# Patient Record
Sex: MALE | Race: WHITE | URBAN - METROPOLITAN AREA
[De-identification: names, ages, dates, MRNs, and addresses within clinical notes are randomized per-mention and may not be internally consistent; named-entity substitution may affect disease eponyms.]

---

## 2022-10-04 ENCOUNTER — OFFICE VISIT (OUTPATIENT)
Dept: PRIMARY CARE CLINIC | Age: 52
End: 2022-10-04
Payer: MEDICARE

## 2022-10-04 VITALS
HEART RATE: 95 BPM | HEIGHT: 72 IN | WEIGHT: 257.4 LBS | SYSTOLIC BLOOD PRESSURE: 155 MMHG | TEMPERATURE: 97.8 F | DIASTOLIC BLOOD PRESSURE: 92 MMHG | BODY MASS INDEX: 34.86 KG/M2 | OXYGEN SATURATION: 98 % | RESPIRATION RATE: 16 BRPM

## 2022-10-04 DIAGNOSIS — E78.1 HYPERTRIGLYCERIDEMIA: ICD-10-CM

## 2022-10-04 DIAGNOSIS — M25.561 CHRONIC PAIN OF BOTH KNEES: ICD-10-CM

## 2022-10-04 DIAGNOSIS — G47.30 OBSERVED SLEEP APNEA: ICD-10-CM

## 2022-10-04 DIAGNOSIS — I10 ESSENTIAL HYPERTENSION: ICD-10-CM

## 2022-10-04 DIAGNOSIS — Z13.220 SCREENING FOR HYPERLIPIDEMIA: ICD-10-CM

## 2022-10-04 DIAGNOSIS — Z12.11 SCREEN FOR COLON CANCER: Primary | ICD-10-CM

## 2022-10-04 DIAGNOSIS — Z23 ENCOUNTER FOR IMMUNIZATION: ICD-10-CM

## 2022-10-04 DIAGNOSIS — M25.562 CHRONIC PAIN OF BOTH KNEES: ICD-10-CM

## 2022-10-04 DIAGNOSIS — G89.29 CHRONIC PAIN OF BOTH KNEES: ICD-10-CM

## 2022-10-04 PROCEDURE — 99204 OFFICE O/P NEW MOD 45 MIN: CPT | Performed by: FAMILY MEDICINE

## 2022-10-04 PROCEDURE — G0008 ADMIN INFLUENZA VIRUS VAC: HCPCS | Performed by: FAMILY MEDICINE

## 2022-10-04 PROCEDURE — 90686 IIV4 VACC NO PRSV 0.5 ML IM: CPT | Performed by: FAMILY MEDICINE

## 2022-10-04 RX ORDER — TESTOSTERONE ENANTHATE 200 MG/ML
VIAL (ML) INTRAMUSCULAR ONCE
COMMUNITY

## 2022-10-04 RX ORDER — TESTOSTERONE CYPIONATE 100 MG/ML
INJECTION, SOLUTION INTRAMUSCULAR ONCE
COMMUNITY
End: 2022-10-04

## 2022-10-04 RX ORDER — AMLODIPINE BESYLATE 10 MG/1
10 TABLET ORAL DAILY
Qty: 30 TABLET | Refills: 5 | Status: SHIPPED | OUTPATIENT
Start: 2022-10-04

## 2022-10-04 RX ORDER — SPIRONOLACTONE 50 MG/1
50 TABLET, FILM COATED ORAL DAILY
COMMUNITY
Start: 2022-09-17

## 2022-10-04 NOTE — PROGRESS NOTES
HPI     Chief Complaint   Patient presents with    [facility]     He is a 50 y.o. male who presents to establish care. Pmhx : HTN, gout, low testosterone. Surghx : none. Sochx : no tobacco use. Drinks 2 days per week, 3-4 drinks when he does. Is not exercising regularly. Maintains a healthy diet. Fhx : noncontributory. HTN : intolerant of hctz as this worsened gout. Home bp have been elevated. Sbp trending in 150s. Testosteron def : follows with urology. Labs 8/23/22, cbc, cmp, psa and testosterone were ok. C/o intermittent knee pain for several months now. Seems to occur throughout the day. 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 Seemed worse when he was walking daily for exercise. Establishing Care  - Chronic medical problems:  Past Medical History:   Diagnosis Date    Gout     Hypertension      - Current medications:   Current Outpatient Medications   Medication Sig    ALLOPURINOL PO     spironolactone (ALDACTONE) 50 mg tablet Take 50 mg by mouth daily. testosterone enanthate (DELATESTRYL) 200 mg/mL injection by IntraMUSCular route once. No current facility-administered medications for this visit. - Family history:   Family History   Problem Relation Age of Onset    Hypertension Mother      - Allergies: No Known Allergies  - Surgical history: History reviewed.  No pertinent surgical history.  - Social history (sexually active, occupation, smoker, etoh use, etc):   Social History     Socioeconomic History    Marital status: UNKNOWN     Spouse name: Not on file    Number of children: Not on file    Years of education: Not on file    Highest education level: Not on file   Occupational History    Not on file   Tobacco Use    Smoking status: Former     Types: Cigarettes    Smokeless tobacco: Never   Vaping Use    Vaping Use: Never used   Substance and Sexual Activity    Alcohol use: Yes    Drug use: Never    Sexual activity: Not on file   Other Topics Concern    Not on file Social History Narrative    Not on file     Social Determinants of Health     Financial Resource Strain: Low Risk     Difficulty of Paying Living Expenses: Not hard at all   Food Insecurity: No Food Insecurity    Worried About Running Out of Food in the Last Year: Never true    Ran Out of Food in the Last Year: Never true   Transportation Needs: Not on file   Physical Activity: Insufficiently Active    Days of Exercise per Week: 2 days    Minutes of Exercise per Session: 30 min   Stress: Not on file   Social Connections: Not on file   Intimate Partner Violence: Not At Risk    Fear of Current or Ex-Partner: No    Emotionally Abused: No    Physically Abused: No    Sexually Abused: No   Housing Stability: Not on file         Review of Systems  Denies fever, chills, chest pain, shortness of breath, abd pain, nausea, vomiting. No changes in bm. No signs of bleeding. No syncope or presyncope. Reviewed PmHx, RxHx, FmHx, SocHx, AllgHx and updated and dated in the chart. Physical Exam:  Visit Vitals  BP (!) 155/92   Pulse 95   Temp 97.8 °F (36.6 °C) (Temporal)   Resp 16   Ht 6' (1.829 m)   Wt 257 lb 6.4 oz (116.8 kg)   SpO2 98%   BMI 34.91 kg/m²     Physical Exam  Vitals reviewed. Constitutional:       Appearance: Normal appearance. HENT:      Head: Normocephalic and atraumatic. Eyes:      Conjunctiva/sclera: Conjunctivae normal.      Pupils: Pupils are equal, round, and reactive to light. Cardiovascular:      Rate and Rhythm: Normal rate and regular rhythm. Pulses: Normal pulses. Heart sounds: Normal heart sounds. Pulmonary:      Effort: Pulmonary effort is normal.      Breath sounds: Normal breath sounds. Musculoskeletal:      Left lower leg: No edema. Comments: Bilat knee exam : Knee ROM intact. No appreciated joint instability. No joint effusion or tenderness. Tender just distal to medial knee joint bilat. No calf tenderness. Bilat LE distal pulses intact.    Skin:     General: Skin is warm and dry. Neurological:      General: No focal deficit present. Mental Status: He is alert and oriented to person, place, and time. Psychiatric:         Mood and Affect: Mood normal.         Behavior: Behavior normal.         Thought Content: Thought content normal.            Assessment / Plan     Diagnoses and all orders for this visit:    1. Screen for colon cancer  -     REFERRAL TO GASTROENTEROLOGY    2. Screening for hyperlipidemia  -     LIPID PANEL; Future    3. Observed sleep apnea  -     SLEEP MEDICINE REFERRAL    4. Essential hypertension  -     LIPID PANEL; Future  -     SLEEP MEDICINE REFERRAL  -     amLODIPine (NORVASC) 10 mg tablet; Take 1 Tablet by mouth daily. 5. Encounter for immunization  -     INFLUENZA, FLUARIX, FLULAVAL, FLUZONE (AGE 6 MO+), AFLURIA(AGE 3Y+) IM, PF, 0.5 ML    6. Chronic pain of both knees  Suspect pes anserine bursitis. Try applied ice and stretching exercises daily, handout provided. Follow up for xrays and PT if not improved within 2 weeks. Advised regular exercise, hh diet. Start amlodipine. Bp goals discussed. Follow up if bp not at goal within 2 weeks. Obtain medical records    I have discussed the diagnosis with the patient and the intended plan as seen in the above orders.      Jaquan Robertson, DO

## 2022-10-05 RX ORDER — ATORVASTATIN CALCIUM 20 MG/1
20 TABLET, FILM COATED ORAL DAILY
Qty: 30 TABLET | Refills: 5 | Status: SHIPPED | OUTPATIENT
Start: 2022-10-05

## 2022-11-03 ENCOUNTER — OFFICE VISIT (OUTPATIENT)
Dept: SLEEP MEDICINE | Age: 52
End: 2022-11-03
Payer: MEDICARE

## 2022-11-03 VITALS
BODY MASS INDEX: 34.93 KG/M2 | HEIGHT: 72 IN | WEIGHT: 257.9 LBS | HEART RATE: 64 BPM | SYSTOLIC BLOOD PRESSURE: 157 MMHG | OXYGEN SATURATION: 97 % | DIASTOLIC BLOOD PRESSURE: 83 MMHG

## 2022-11-03 DIAGNOSIS — G47.33 OSA (OBSTRUCTIVE SLEEP APNEA): Primary | ICD-10-CM

## 2022-11-03 DIAGNOSIS — E66.9 OBESITY (BMI 30.0-34.9): ICD-10-CM

## 2022-11-03 PROCEDURE — G8417 CALC BMI ABV UP PARAM F/U: HCPCS | Performed by: SPECIALIST

## 2022-11-03 PROCEDURE — 99204 OFFICE O/P NEW MOD 45 MIN: CPT | Performed by: SPECIALIST

## 2022-11-03 PROCEDURE — G8432 DEP SCR NOT DOC, RNG: HCPCS | Performed by: SPECIALIST

## 2022-11-03 PROCEDURE — G8427 DOCREV CUR MEDS BY ELIG CLIN: HCPCS | Performed by: SPECIALIST

## 2022-11-03 PROCEDURE — 3017F COLORECTAL CA SCREEN DOC REV: CPT | Performed by: SPECIALIST

## 2022-11-03 NOTE — PROGRESS NOTES
[address] [facility] [place]. [place], [address]  Tel.  [phone]  Fax. 100 [facility] 60  [place], [address] [facility]  Tel.  [phone]  Fax. [phone] [address]  Tel.  [phone]  Fax. [phone]       Chief Complaint       Chief Complaint   Patient presents with    Sleep Problem     NP refd by Valente Linda DO for witnessed apnea       HPI      Alben Closs is 50 y.o. male seen for evaluation of a sleep disorder. The patient reports he has experienced snoring described as loud; may be more pronounced after consuming alcohol. Normally retires between 8: 30-11: 30 PM and will get a bed between 5-6 AM.  Tired at times on awakening. Does note frequent dreams. May have difficulty with sleep initiation. Denies sleep talking or sleepwalking, bruxism or nocturnal incontinence, abnormal arm or leg movements, hypnagogue hallucinations, sleep paralysis or cataplexy. Does not fall asleep inappropriately during the day. The patient has not undergone diagnostic testing for the current problems. [place] Sleepiness Score: (P) 4       No Known Allergies    Current Outpatient Medications   Medication Sig Dispense Refill    atorvastatin (LIPITOR) 20 mg tablet Take 1 Tablet by mouth daily. 30 Tablet 5    ALLOPURINOL PO       testosterone enanthate (DELATESTRYL) 200 mg/mL injection by IntraMUSCular route once. amLODIPine (NORVASC) 10 mg tablet Take 1 Tablet by mouth daily. 30 Tablet 5    spironolactone (ALDACTONE) 50 mg tablet Take 50 mg by mouth daily. He  has a past medical history of Gout and Hypertension. He  has no past surgical history on file. He family history includes Hypertension in his mother. He  reports that he has quit smoking. His smoking use included cigarettes. He has never used smokeless tobacco. He reports current alcohol use. He reports that he does not use drugs.      Review of Systems:  Review of Systems   HENT:  Negative for hearing loss and tinnitus. Eyes:  Negative for blurred vision and double vision. Respiratory:  Negative for cough and shortness of breath. Cardiovascular:  Negative for chest pain and palpitations. Gastrointestinal:  Negative for abdominal pain and heartburn. Genitourinary:  Negative for frequency and urgency. Musculoskeletal:  Negative for back pain and neck pain. Neurological:  Negative for dizziness and headaches. Psychiatric/Behavioral:  Negative for depression. The patient is not nervous/anxious. Objective:   Visit Vitals  BP (!) 157/83 (BP 1 Location: Left upper arm, BP Patient Position: Sitting)   Pulse 64   Ht 6' (1.829 m)   Wt 257 lb 14.4 oz (117 kg)   SpO2 97%   BMI 34.98 kg/m²     Body mass index is 34.98 kg/m². General:   Conversant, cooperative   Eyes:  Pupils equal and reactive, no nystagmus   Oropharynx:   Mallampati score III,  tongue scalloped, narrow posterior oral airway       Neck:   No carotid bruits; Neck circ. in \"inches\": 18.5   Chest/Lungs:  Clear on auscultation    CVS:  Normal rate, regular rhythm   Skin:  Warm to touch; no obvious rashes   Neuro:  Speech fluent, face symmetrical, tongue movement normal   Psych:  Normal affect,  normal countenance        Assessment:       ICD-10-CM ICD-9-CM    1. VERNELL (obstructive sleep apnea)  G47.33 327.23 SLEEP STUDY UNATTENDED, 4 CHANNEL      2. Obesity (BMI 30.0-34. 9)  E66.9 278.00         History consistent with sleep disordered breathing. Patient has a narrow posterior oral airway, large tongue. Sleep breathing abnormalities may be more prominent when supine, and/or in rem sleep. Benefit of weight reduction. Patient was advised weight loss measures often more effective when sleep disordered breathing concurrently treated.     Plan:     Orders Placed This Encounter    SLEEP STUDY UNATTENDED, 4 CHANNEL     Order Specific Question:   Reason for Exam     Answer:   snoring * Patient has a history and examination consistent with the diagnosis of sleep apnea. *Home sleep testing was ordered for initial evaluation. * He was provided information on sleep apnea including corresponding risk factors and the importance of proper treatment. * Treatment options if indicated were reviewed today. Instructions: The patient would benefit from weight reduction measures. Do not engage in activities requiring a normal degree of alertness if fatigue is present. The patient understands that untreated or undertreated sleep apnea could impair judgement and the ability to function normally during the day. Call or return if symptoms worsen or persist.          Troy Barron MD, FAASM  Electronically signed 11/04/22       This note was created using voice recognition software. Despite editing, there may be syntax errors. This note will not be viewable in [address]

## 2022-12-05 ENCOUNTER — HOSPITAL ENCOUNTER (OUTPATIENT)
Dept: SLEEP MEDICINE | Age: 52
Discharge: HOME | End: 2022-12-05
Payer: MEDICARE

## 2022-12-05 ENCOUNTER — OFFICE VISIT (OUTPATIENT)
Dept: SLEEP MEDICINE | Age: 52
End: 2022-12-05

## 2022-12-05 DIAGNOSIS — G47.33 OSA (OBSTRUCTIVE SLEEP APNEA): Primary | ICD-10-CM

## 2022-12-05 PROCEDURE — 95806 SLEEP STUDY UNATT&RESP EFFT: CPT | Performed by: SPECIALIST

## 2022-12-05 NOTE — PROGRESS NOTES
217 [facility]. [place], [address]  Tel.  [phone]  Fax. 100 [facility] 60  [place], [address] [facility]  Tel.  [phone]  Fax. [phone] [address] [facility] [address]   Tel.  [phone]  Fax. [phone]       S>Drew Mari is a 50 y.o. male seen today to receive a home sleep testing unit (HST). Patient was educated on proper hookup and operation of the HST. Instruction forms and documentation were reviewed and signed. The patient demonstrated good understanding of the HST.    O>    There were no vitals taken for this visit. A>  No diagnosis found. P>  General information regarding operations and maintenance of the device was provided. He was provided information on sleep apnea including coresponding risk factors and the importance of proper treatment. Follow-up appointment was made to return the HST. He will be contacted once the results have been reviewed. He was asked to contact our office for any problems regarding his home sleep test study.     [facility] [id number]

## 2022-12-06 ENCOUNTER — TELEPHONE (OUTPATIENT)
Dept: SLEEP MEDICINE | Age: 52
End: 2022-12-06

## 2022-12-06 DIAGNOSIS — G47.33 OSA (OBSTRUCTIVE SLEEP APNEA): Primary | ICD-10-CM

## 2022-12-08 ENCOUNTER — DOCUMENTATION ONLY (OUTPATIENT)
Dept: SLEEP MEDICINE | Age: 52
End: 2022-12-08

## 2022-12-08 NOTE — TELEPHONE ENCOUNTER
HSAT demonstrated sleep disordered breathing characterized by an overall AHI of 12.3/h associated with minimal SaO2 of 67%. Snoring during 77.5%. HSAT potentially underestimated sleep apnea severity due to periods of absent pulse ox signal.    Recommendation: APAP 7-18 cm    Sleep technologist: Please review HSAT results with the patient. Order has been entered for APAP 7-18 cm. Please schedule first compliance appointment.

## 2022-12-12 ENCOUNTER — PATIENT MESSAGE (OUTPATIENT)
Dept: SLEEP MEDICINE | Age: 52
End: 2022-12-12

## 2023-04-20 ENCOUNTER — DOCUMENTATION ONLY (OUTPATIENT)
Dept: SLEEP MEDICINE | Age: 53
End: 2023-04-20

## 2023-04-20 ENCOUNTER — OFFICE VISIT (OUTPATIENT)
Dept: SLEEP MEDICINE | Age: 53
End: 2023-04-20
Payer: MEDICARE

## 2023-04-20 VITALS
DIASTOLIC BLOOD PRESSURE: 95 MMHG | WEIGHT: 260.9 LBS | SYSTOLIC BLOOD PRESSURE: 156 MMHG | HEIGHT: 72 IN | BODY MASS INDEX: 35.34 KG/M2 | HEART RATE: 94 BPM | OXYGEN SATURATION: 91 %

## 2023-04-20 DIAGNOSIS — G47.33 OSA (OBSTRUCTIVE SLEEP APNEA): Primary | ICD-10-CM

## 2023-04-20 DIAGNOSIS — I10 PRIMARY HYPERTENSION: ICD-10-CM

## 2023-04-20 PROCEDURE — G8427 DOCREV CUR MEDS BY ELIG CLIN: HCPCS | Performed by: SPECIALIST

## 2023-04-20 PROCEDURE — 99213 OFFICE O/P EST LOW 20 MIN: CPT | Performed by: SPECIALIST

## 2023-04-20 PROCEDURE — 3080F DIAST BP >= 90 MM HG: CPT | Performed by: SPECIALIST

## 2023-04-20 PROCEDURE — 3077F SYST BP >= 140 MM HG: CPT | Performed by: SPECIALIST

## 2023-04-20 PROCEDURE — G8417 CALC BMI ABV UP PARAM F/U: HCPCS | Performed by: SPECIALIST

## 2023-04-20 PROCEDURE — 3017F COLORECTAL CA SCREEN DOC REV: CPT | Performed by: SPECIALIST

## 2023-04-20 PROCEDURE — G8432 DEP SCR NOT DOC, RNG: HCPCS | Performed by: SPECIALIST

## 2023-04-20 NOTE — PROGRESS NOTES
217 [facility]., [place]. [place], [address]  Tel.  [phone]  Fax. 100 [facility] 60  [place], [address] [facility]  Tel.  [phone]  Fax. [phone] [address]   Tel.  [phone]  Fax. [phone]         Chief Complaint       Chief Complaint   Patient presents with    Sleep Problem     1st adh will bring device         HPI      h  Joel Astudillo is a 50 y.o. male seen for follow-up. He was evaluated with a home sleep study which demonstrated sleep disordered breathing characterized by an overall AHI of 12.3/h associated with minimal SaO2 of 67%. Snoring during 77.5%. HSAT potentially underestimated sleep apnea severity due to periods of absent pulse ox signal.     Recommendation: APAP 7-18     Compliance data downloaded and reviewed in detail with the patient today. During the past 30 days, APAP used during 29 days with the average daily use of 6 hours. CMS compliance criteria 90%. AHI 0.4 per hour. With CPAP is not experiencing significant nocturnal awakening, nonrestorative sleep or excessive daytime sleepiness. [place] Sleepiness Scale: 3    No Known Allergies    Current Outpatient Medications   Medication Sig Dispense Refill    atorvastatin (LIPITOR) 20 mg tablet Take 1 Tablet by mouth daily. 30 Tablet 5    ALLOPURINOL PO       spironolactone (ALDACTONE) 50 mg tablet Take 1 Tablet by mouth daily. testosterone enanthate (DELATESTRYL) 200 mg/mL injection by IntraMUSCular route once. amLODIPine (NORVASC) 10 mg tablet Take 1 Tablet by mouth daily. 30 Tablet 5        He  has a past medical history of Gout and Hypertension. He  has no past surgical history on file. He family history includes Hypertension in his mother. He  reports that he has quit smoking. His smoking use included cigarettes. He has never used smokeless tobacco. He reports current alcohol use. He reports that he does not use drugs. Review of Systems:  Unchanged per patient      Objective:   Visit Vitals  BP (!) 156/95 (BP 1 Location: Left upper arm, BP Patient Position: Sitting, BP Cuff Size: Large adult)   Pulse 94   Ht 6' (1.829 m)   Wt 260 lb 14.4 oz (118.3 kg)   SpO2 91%   BMI 35.38 kg/m²     Body mass index is 35.38 kg/m². General:   Conversant, cooperative       Oropharynx:   Mallampati score III, tongue scalloped, narrow posterior oral airway                CVS:  Normal rate, regular rhythm        Neuro:  Speech fluent, face symmetrical             Assessment:       ICD-10-CM ICD-9-CM    1. VERNELL (obstructive sleep apnea)  G47.33 327.23       2. Primary hypertension  I10 401.9           he is compliant with PAP therapy and PAP continues to benefit patient and remains necessary for control of his sleep apnea. To follow-up with PCP re. hypertension    Plan:   No orders of the defined types were placed in this encounter. *A copy of compliance data was provided to the patient and reviewed in detail. *CPAP will be continued at the above pressure settings. The patient is to contact the office if there are problems with either mask or pressure settings. Follow-up will be scheduled at which time compliance data will be reviewed. * Patient has a history and examination consistent with the diagnosis of sleep apnea. * Treatment options if indicated were reviewed today. Oskar Cruz MD, FAASM  Electronically signed 04/20/23        This note was created using voice recognition software. Despite editing, there may be syntax errors. This note will not be viewable in [address]

## 2023-07-23 DIAGNOSIS — E78.1 PURE HYPERGLYCERIDEMIA: ICD-10-CM

## 2023-07-24 RX ORDER — ATORVASTATIN CALCIUM 20 MG/1
TABLET, FILM COATED ORAL
Qty: 90 TABLET | Refills: 0 | Status: SHIPPED | OUTPATIENT
Start: 2023-07-24

## 2023-07-28 DIAGNOSIS — I10 ESSENTIAL (PRIMARY) HYPERTENSION: ICD-10-CM

## 2023-07-31 RX ORDER — AMLODIPINE BESYLATE 10 MG/1
TABLET ORAL
Qty: 30 TABLET | Refills: 0 | Status: SHIPPED | OUTPATIENT
Start: 2023-07-31

## 2023-08-27 DIAGNOSIS — I10 ESSENTIAL (PRIMARY) HYPERTENSION: ICD-10-CM

## 2023-08-31 DIAGNOSIS — I10 ESSENTIAL (PRIMARY) HYPERTENSION: ICD-10-CM

## 2023-08-31 RX ORDER — AMLODIPINE BESYLATE 10 MG/1
TABLET ORAL
Qty: 30 TABLET | Refills: 0 | Status: SHIPPED | OUTPATIENT
Start: 2023-08-31

## 2023-09-05 RX ORDER — AMLODIPINE BESYLATE 10 MG/1
TABLET ORAL
Qty: 30 TABLET | Refills: 0 | OUTPATIENT
Start: 2023-09-05

## 2023-10-02 DIAGNOSIS — I10 ESSENTIAL (PRIMARY) HYPERTENSION: ICD-10-CM

## 2023-10-04 RX ORDER — AMLODIPINE BESYLATE 10 MG/1
TABLET ORAL
Qty: 30 TABLET | Refills: 0 | OUTPATIENT
Start: 2023-10-04

## 2023-10-04 NOTE — TELEPHONE ENCOUNTER
Requested Prescriptions     Pending Prescriptions Disp Refills    amLODIPine (NORVASC) 10 MG tablet [Pharmacy Med Name: AMLODIPINE BESYLATE 10 MG TAB] 30 tablet 0     Sig: APPOINTMENT REQUIRED FOR FURTHER REFILLS.         Last Visit 10/4/22  Last Refill 8/31/23

## 2023-10-26 DIAGNOSIS — E78.1 PURE HYPERGLYCERIDEMIA: ICD-10-CM

## 2023-10-31 DIAGNOSIS — I10 ESSENTIAL (PRIMARY) HYPERTENSION: ICD-10-CM

## 2023-11-02 RX ORDER — ATORVASTATIN CALCIUM 20 MG/1
TABLET, FILM COATED ORAL
Qty: 90 TABLET | Refills: 0 | OUTPATIENT
Start: 2023-11-02

## 2023-11-02 RX ORDER — AMLODIPINE BESYLATE 10 MG/1
TABLET ORAL
Qty: 30 TABLET | Refills: 0 | OUTPATIENT
Start: 2023-11-02

## 2024-01-19 ENCOUNTER — OFFICE VISIT (OUTPATIENT)
Dept: PRIMARY CARE CLINIC | Facility: CLINIC | Age: 54
End: 2024-01-19
Payer: COMMERCIAL

## 2024-01-19 ENCOUNTER — HOSPITAL ENCOUNTER (OUTPATIENT)
Facility: HOSPITAL | Age: 54
Discharge: HOME | End: 2024-01-19
Attending: FAMILY MEDICINE
Payer: COMMERCIAL

## 2024-01-19 VITALS
OXYGEN SATURATION: 98 % | WEIGHT: 259 LBS | BODY MASS INDEX: 35.08 KG/M2 | RESPIRATION RATE: 12 BRPM | HEIGHT: 72 IN | SYSTOLIC BLOOD PRESSURE: 162 MMHG | TEMPERATURE: 97.8 F | DIASTOLIC BLOOD PRESSURE: 92 MMHG | HEART RATE: 84 BPM

## 2024-01-19 DIAGNOSIS — R73.02 IGT (IMPAIRED GLUCOSE TOLERANCE): ICD-10-CM

## 2024-01-19 DIAGNOSIS — E78.1 PURE HYPERGLYCERIDEMIA: ICD-10-CM

## 2024-01-19 DIAGNOSIS — G89.29 CHRONIC PAIN OF LEFT KNEE: ICD-10-CM

## 2024-01-19 DIAGNOSIS — Z12.11 SCREEN FOR COLON CANCER: ICD-10-CM

## 2024-01-19 DIAGNOSIS — M25.562 CHRONIC PAIN OF LEFT KNEE: ICD-10-CM

## 2024-01-19 DIAGNOSIS — I10 ESSENTIAL (PRIMARY) HYPERTENSION: Primary | ICD-10-CM

## 2024-01-19 PROCEDURE — 4004F PT TOBACCO SCREEN RCVD TLK: CPT | Performed by: FAMILY MEDICINE

## 2024-01-19 PROCEDURE — 73562 X-RAY EXAM OF KNEE 3: CPT

## 2024-01-19 PROCEDURE — G8427 DOCREV CUR MEDS BY ELIG CLIN: HCPCS | Performed by: FAMILY MEDICINE

## 2024-01-19 PROCEDURE — G8484 FLU IMMUNIZE NO ADMIN: HCPCS | Performed by: FAMILY MEDICINE

## 2024-01-19 PROCEDURE — 3079F DIAST BP 80-89 MM HG: CPT | Performed by: FAMILY MEDICINE

## 2024-01-19 PROCEDURE — G8417 CALC BMI ABV UP PARAM F/U: HCPCS | Performed by: FAMILY MEDICINE

## 2024-01-19 PROCEDURE — 99214 OFFICE O/P EST MOD 30 MIN: CPT | Performed by: FAMILY MEDICINE

## 2024-01-19 PROCEDURE — 3077F SYST BP >= 140 MM HG: CPT | Performed by: FAMILY MEDICINE

## 2024-01-19 PROCEDURE — 3017F COLORECTAL CA SCREEN DOC REV: CPT | Performed by: FAMILY MEDICINE

## 2024-01-19 RX ORDER — AMLODIPINE BESYLATE AND BENAZEPRIL HYDROCHLORIDE 10; 20 MG/1; MG/1
1 CAPSULE ORAL DAILY
Qty: 30 CAPSULE | Refills: 3 | Status: SHIPPED | OUTPATIENT
Start: 2024-01-19 | End: 2024-01-19

## 2024-01-19 RX ORDER — AMLODIPINE AND BENAZEPRIL HYDROCHLORIDE 10; 40 MG/1; MG/1
1 CAPSULE ORAL DAILY
Qty: 30 CAPSULE | Refills: 3 | Status: SHIPPED | OUTPATIENT
Start: 2024-01-19

## 2024-01-19 SDOH — ECONOMIC STABILITY: FOOD INSECURITY: WITHIN THE PAST 12 MONTHS, YOU WORRIED THAT YOUR FOOD WOULD RUN OUT BEFORE YOU GOT MONEY TO BUY MORE.: NEVER TRUE

## 2024-01-19 SDOH — ECONOMIC STABILITY: HOUSING INSECURITY
IN THE LAST 12 MONTHS, WAS THERE A TIME WHEN YOU DID NOT HAVE A STEADY PLACE TO SLEEP OR SLEPT IN A SHELTER (INCLUDING NOW)?: NO

## 2024-01-19 SDOH — ECONOMIC STABILITY: FOOD INSECURITY: WITHIN THE PAST 12 MONTHS, THE FOOD YOU BOUGHT JUST DIDN'T LAST AND YOU DIDN'T HAVE MONEY TO GET MORE.: NEVER TRUE

## 2024-01-19 SDOH — ECONOMIC STABILITY: INCOME INSECURITY: HOW HARD IS IT FOR YOU TO PAY FOR THE VERY BASICS LIKE FOOD, HOUSING, MEDICAL CARE, AND HEATING?: NOT HARD AT ALL

## 2024-01-19 ASSESSMENT — PATIENT HEALTH QUESTIONNAIRE - PHQ9
SUM OF ALL RESPONSES TO PHQ QUESTIONS 1-9: 0
1. LITTLE INTEREST OR PLEASURE IN DOING THINGS: 0
SUM OF ALL RESPONSES TO PHQ9 QUESTIONS 1 & 2: 0
2. FEELING DOWN, DEPRESSED OR HOPELESS: 0
SUM OF ALL RESPONSES TO PHQ QUESTIONS 1-9: 0

## 2024-01-19 NOTE — PROGRESS NOTES
\"Have you been to the ER, urgent care clinic since your last visit?  Hospitalized since your last visit?\"    NO    “Have you seen or consulted any other health care providers outside of [facility] since your last visit?”    NO    “Have you had a colorectal cancer screening such as a colonoscopy/FIT/Cologuard?    NO

## 2024-01-19 NOTE — PROGRESS NOTES
Subjective  Urban Mcallister is an 53 y.o. male who presents for follow up.    HTN : intolerant of hctz as this worsened gout.      Testosteron def : follows with urology. Reports metatolic labs and cbc yesterday.      KATIE. Following with sleep center.    Low T. Following with urology. Labs performed yesterday.    Left knee pain. Stiffness in the knee. Exacerbated by prolonged standing, climbing stairs. .    Gout. Following with rheum.    Consulted at [facility].     He's started walking for exercise this week. Tolerating this well.     Admits he drinks 2-3 servings of tequila per day. Admits to unhealthy diet.       Allergies - reviewed:   Not on File      Medications - reviewed:   Current Outpatient Medications   Medication Sig    amLODIPine (NORVASC) 10 MG tablet TAKE 1 TABLET BY MOUTH DAILY. DUE FOLLOW UP    atorvastatin (LIPITOR) 20 MG tablet TAKE 1 TABLET BY MOUTH DAILY. DUE FOLLOW UP    ALLOPURINOL PO E clarify this medication. Outside name: ALLOPURINOL PO    spironolactone (ALDACTONE) 50 MG tablet Take 1 tablet by mouth daily    testosterone enanthate (DELATESTRYL) 200 MG/ML injection Inject into the muscle once.     No current facility-administered medications for this visit.         Past Medical History - reviewed:  Past Medical History:   Diagnosis Date    Gout     Hypertension          Past Surgical History - reviewed:   No past surgical history on file.      Social History - reviewed:  Social History     Socioeconomic History    Marital status: Unknown     Spouse name: Not on file    Number of children: Not on file    Years of education: Not on file    Highest education level: Not on file   Occupational History    Not on file   Tobacco Use    Smoking status: Former    Smokeless tobacco: Never   Substance and Sexual Activity    Alcohol use: Yes    Drug use: Never    Sexual activity: Not on file   Other Topics Concern    Not on file   Social History Narrative    Not on file     Social

## 2024-02-29 ENCOUNTER — OFFICE VISIT (OUTPATIENT)
Dept: PRIMARY CARE CLINIC | Facility: CLINIC | Age: 54
End: 2024-02-29
Payer: COMMERCIAL

## 2024-02-29 VITALS
RESPIRATION RATE: 16 BRPM | OXYGEN SATURATION: 98 % | HEART RATE: 88 BPM | TEMPERATURE: 97.3 F | DIASTOLIC BLOOD PRESSURE: 69 MMHG | HEIGHT: 72 IN | BODY MASS INDEX: 35.76 KG/M2 | SYSTOLIC BLOOD PRESSURE: 132 MMHG | WEIGHT: 264 LBS

## 2024-02-29 DIAGNOSIS — I10 ESSENTIAL (PRIMARY) HYPERTENSION: ICD-10-CM

## 2024-02-29 DIAGNOSIS — E11.9 CONTROLLED TYPE 2 DIABETES MELLITUS WITHOUT COMPLICATION, WITHOUT LONG-TERM CURRENT USE OF INSULIN (HCC): ICD-10-CM

## 2024-02-29 DIAGNOSIS — M1A.09X0 CHRONIC GOUT OF MULTIPLE SITES, UNSPECIFIED CAUSE: Primary | ICD-10-CM

## 2024-02-29 PROCEDURE — G8427 DOCREV CUR MEDS BY ELIG CLIN: HCPCS | Performed by: FAMILY MEDICINE

## 2024-02-29 PROCEDURE — 99214 OFFICE O/P EST MOD 30 MIN: CPT | Performed by: FAMILY MEDICINE

## 2024-02-29 PROCEDURE — 3017F COLORECTAL CA SCREEN DOC REV: CPT | Performed by: FAMILY MEDICINE

## 2024-02-29 PROCEDURE — G8417 CALC BMI ABV UP PARAM F/U: HCPCS | Performed by: FAMILY MEDICINE

## 2024-02-29 PROCEDURE — 3078F DIAST BP <80 MM HG: CPT | Performed by: FAMILY MEDICINE

## 2024-02-29 PROCEDURE — 2022F DILAT RTA XM EVC RTNOPTHY: CPT | Performed by: FAMILY MEDICINE

## 2024-02-29 PROCEDURE — 4004F PT TOBACCO SCREEN RCVD TLK: CPT | Performed by: FAMILY MEDICINE

## 2024-02-29 PROCEDURE — 3075F SYST BP GE 130 - 139MM HG: CPT | Performed by: FAMILY MEDICINE

## 2024-02-29 PROCEDURE — 3046F HEMOGLOBIN A1C LEVEL >9.0%: CPT | Performed by: FAMILY MEDICINE

## 2024-02-29 PROCEDURE — G8484 FLU IMMUNIZE NO ADMIN: HCPCS | Performed by: FAMILY MEDICINE

## 2024-02-29 RX ORDER — ALLOPURINOL 200 MG/1
200 TABLET ORAL DAILY
Qty: 90 TABLET | Refills: 3 | Status: SHIPPED | OUTPATIENT
Start: 2024-02-29

## 2024-02-29 RX ORDER — TESTOSTERONE ENANTHATE 75 MG/.5ML
INJECTION SUBCUTANEOUS
COMMUNITY
Start: 2024-02-01

## 2024-02-29 SDOH — ECONOMIC STABILITY: FOOD INSECURITY: WITHIN THE PAST 12 MONTHS, THE FOOD YOU BOUGHT JUST DIDN'T LAST AND YOU DIDN'T HAVE MONEY TO GET MORE.: NEVER TRUE

## 2024-02-29 SDOH — ECONOMIC STABILITY: INCOME INSECURITY: HOW HARD IS IT FOR YOU TO PAY FOR THE VERY BASICS LIKE FOOD, HOUSING, MEDICAL CARE, AND HEATING?: NOT HARD AT ALL

## 2024-02-29 SDOH — ECONOMIC STABILITY: FOOD INSECURITY: WITHIN THE PAST 12 MONTHS, YOU WORRIED THAT YOUR FOOD WOULD RUN OUT BEFORE YOU GOT MONEY TO BUY MORE.: NEVER TRUE

## 2024-02-29 ASSESSMENT — PATIENT HEALTH QUESTIONNAIRE - PHQ9
2. FEELING DOWN, DEPRESSED OR HOPELESS: 0
SUM OF ALL RESPONSES TO PHQ QUESTIONS 1-9: 0
1. LITTLE INTEREST OR PLEASURE IN DOING THINGS: 0
SUM OF ALL RESPONSES TO PHQ9 QUESTIONS 1 & 2: 0

## 2024-02-29 NOTE — PROGRESS NOTES
\"Have you been to the ER, urgent care clinic since your last visit?  Hospitalized since your last visit?\"    NO    “Have you seen or consulted any other health care providers outside of [facility] since your last visit?”    NO    “Have you had a colorectal cancer screening such as a colonoscopy/FIT/Cologuard?    NO

## 2024-02-29 NOTE — PROGRESS NOTES
Subjective  Urban Mcallister is an 53 y.o. male who presents for follow up.     HTN : controlled on lotrel.      Testosteron def : follows with urology. Reports metatolic labs and cbc yesterday.      KATIE. Following with sleep center.     Low T. Following with urology. Labs performed yesterday.     DM2, most recent A1c 6.9%. intolerant of metformin due to GI side effects. He is interested in GLP1 medications. He does not want to take a statin.      Gout. Following with rheum. On allopurinol 100mg daily. Most recent uric acid >6.      Recent labs with rheum, cbc wnl, cmp wnl, uric acid > 6, A1c 6.9%.     Last OV referred to GI for CRC screening.     Allergies - reviewed:   No Known Allergies      Medications - reviewed:   Current Outpatient Medications   Medication Sig    XYOSTED 75 MG/0.5ML SOAJ     metFORMIN (GLUCOPHAGE) 500 MG tablet Take 1 tablet by mouth 2 times daily (with meals)    amLODIPine-benazepril (LOTREL) 10-40 MG per capsule Take 1 capsule by mouth daily    ALLOPURINOL PO Take 100 mg by mouth daily    testosterone enanthate (DELATESTRYL) 200 MG/ML injection Inject into the muscle once.     No current facility-administered medications for this visit.           ROS  CONSTITUTIONAL: Denies fever, chills, unintentional weight loss.  CARDIOVASCULAR: Denies chest pain, orthopnea, PND.  RESPIRATORY: Denies dyspnea, wheezing, hemoptysis.  GI: Denies abdominal pain, diarrhea, constipation, black or bloody stool.         Physical Exam  /69 (Site: Left Upper Arm, Position: Sitting, Cuff Size: Large Adult)   Pulse 88   Temp 97.3 °F (36.3 °C) (Temporal)   Resp 16   Ht 1.829 m (6')   Wt 119.7 kg (264 lb)   SpO2 98%   BMI 35.80 kg/m²   Physical Exam  Vitals reviewed.   Constitutional:       Appearance: Normal appearance.   HENT:      Head: Normocephalic and atraumatic.   Cardiovascular:      Rate and Rhythm: Normal rate and regular rhythm.      Pulses: Normal pulses.   Pulmonary:      Effort:

## 2024-04-09 RX ORDER — AMLODIPINE AND BENAZEPRIL HYDROCHLORIDE 10; 40 MG/1; MG/1
CAPSULE ORAL DAILY
Qty: 90 CAPSULE | Refills: 1 | Status: SHIPPED | OUTPATIENT
Start: 2024-04-09

## 2024-04-17 ENCOUNTER — COMMUNITY OUTREACH (OUTPATIENT)
Dept: PRIMARY CARE CLINIC | Facility: CLINIC | Age: 54
End: 2024-04-17

## 2025-01-10 ENCOUNTER — HOSPITAL ENCOUNTER (OUTPATIENT)
Facility: HOSPITAL | Age: 55
Discharge: HOME | End: 2025-01-13
Payer: COMMERCIAL

## 2025-01-10 DIAGNOSIS — R22.31 MASS OF RIGHT AXILLA: ICD-10-CM

## 2025-01-10 DIAGNOSIS — R22.31 AXILLARY LUMP, RIGHT: ICD-10-CM

## 2025-01-10 PROCEDURE — 76882 US LMTD JT/FCL EVL NVASC XTR: CPT
